# Patient Record
Sex: MALE | Race: WHITE | NOT HISPANIC OR LATINO | URBAN - METROPOLITAN AREA
[De-identification: names, ages, dates, MRNs, and addresses within clinical notes are randomized per-mention and may not be internally consistent; named-entity substitution may affect disease eponyms.]

---

## 2022-10-01 ENCOUNTER — INPATIENT (INPATIENT)
Facility: HOSPITAL | Age: 77
LOS: 4 days | Discharge: HOME HEALTH | End: 2022-10-06
Attending: INTERNAL MEDICINE | Admitting: INTERNAL MEDICINE

## 2022-10-01 VITALS
DIASTOLIC BLOOD PRESSURE: 84 MMHG | HEIGHT: 74 IN | RESPIRATION RATE: 18 BRPM | HEART RATE: 85 BPM | WEIGHT: 235.01 LBS | OXYGEN SATURATION: 99 % | TEMPERATURE: 98 F | SYSTOLIC BLOOD PRESSURE: 177 MMHG

## 2022-10-01 LAB
ALBUMIN SERPL ELPH-MCNC: 4.4 G/DL — SIGNIFICANT CHANGE UP (ref 3.5–5.2)
ALP SERPL-CCNC: 105 U/L — SIGNIFICANT CHANGE UP (ref 30–115)
ALT FLD-CCNC: 14 U/L — SIGNIFICANT CHANGE UP (ref 0–41)
ANION GAP SERPL CALC-SCNC: 12 MMOL/L — SIGNIFICANT CHANGE UP (ref 7–14)
AST SERPL-CCNC: 17 U/L — SIGNIFICANT CHANGE UP (ref 0–41)
BASOPHILS # BLD AUTO: 0.06 K/UL — SIGNIFICANT CHANGE UP (ref 0–0.2)
BASOPHILS NFR BLD AUTO: 0.6 % — SIGNIFICANT CHANGE UP (ref 0–1)
BILIRUB SERPL-MCNC: 0.8 MG/DL — SIGNIFICANT CHANGE UP (ref 0.2–1.2)
BUN SERPL-MCNC: 15 MG/DL — SIGNIFICANT CHANGE UP (ref 10–20)
CALCIUM SERPL-MCNC: 9.3 MG/DL — SIGNIFICANT CHANGE UP (ref 8.4–10.4)
CHLORIDE SERPL-SCNC: 103 MMOL/L — SIGNIFICANT CHANGE UP (ref 98–110)
CO2 SERPL-SCNC: 25 MMOL/L — SIGNIFICANT CHANGE UP (ref 17–32)
CREAT SERPL-MCNC: 1.1 MG/DL — SIGNIFICANT CHANGE UP (ref 0.7–1.5)
EGFR: 69 ML/MIN/1.73M2 — SIGNIFICANT CHANGE UP
EOSINOPHIL # BLD AUTO: 0.25 K/UL — SIGNIFICANT CHANGE UP (ref 0–0.7)
EOSINOPHIL NFR BLD AUTO: 2.7 % — SIGNIFICANT CHANGE UP (ref 0–8)
GLUCOSE SERPL-MCNC: 91 MG/DL — SIGNIFICANT CHANGE UP (ref 70–99)
HCT VFR BLD CALC: 38.8 % — LOW (ref 42–52)
HGB BLD-MCNC: 13.6 G/DL — LOW (ref 14–18)
IMM GRANULOCYTES NFR BLD AUTO: 0.3 % — SIGNIFICANT CHANGE UP (ref 0.1–0.3)
LYMPHOCYTES # BLD AUTO: 1.31 K/UL — SIGNIFICANT CHANGE UP (ref 1.2–3.4)
LYMPHOCYTES # BLD AUTO: 14 % — LOW (ref 20.5–51.1)
MCHC RBC-ENTMCNC: 32.3 PG — HIGH (ref 27–31)
MCHC RBC-ENTMCNC: 35.1 G/DL — SIGNIFICANT CHANGE UP (ref 32–37)
MCV RBC AUTO: 92.2 FL — SIGNIFICANT CHANGE UP (ref 80–94)
MONOCYTES # BLD AUTO: 0.62 K/UL — HIGH (ref 0.1–0.6)
MONOCYTES NFR BLD AUTO: 6.6 % — SIGNIFICANT CHANGE UP (ref 1.7–9.3)
NEUTROPHILS # BLD AUTO: 7.12 K/UL — HIGH (ref 1.4–6.5)
NEUTROPHILS NFR BLD AUTO: 75.8 % — HIGH (ref 42.2–75.2)
NRBC # BLD: 0 /100 WBCS — SIGNIFICANT CHANGE UP (ref 0–0)
PLATELET # BLD AUTO: 205 K/UL — SIGNIFICANT CHANGE UP (ref 130–400)
POTASSIUM SERPL-MCNC: 4.7 MMOL/L — SIGNIFICANT CHANGE UP (ref 3.5–5)
POTASSIUM SERPL-SCNC: 4.7 MMOL/L — SIGNIFICANT CHANGE UP (ref 3.5–5)
PROT SERPL-MCNC: 6.4 G/DL — SIGNIFICANT CHANGE UP (ref 6–8)
RBC # BLD: 4.21 M/UL — LOW (ref 4.7–6.1)
RBC # FLD: 13 % — SIGNIFICANT CHANGE UP (ref 11.5–14.5)
SARS-COV-2 RNA SPEC QL NAA+PROBE: SIGNIFICANT CHANGE UP
SODIUM SERPL-SCNC: 140 MMOL/L — SIGNIFICANT CHANGE UP (ref 135–146)
WBC # BLD: 9.39 K/UL — SIGNIFICANT CHANGE UP (ref 4.8–10.8)
WBC # FLD AUTO: 9.39 K/UL — SIGNIFICANT CHANGE UP (ref 4.8–10.8)

## 2022-10-01 PROCEDURE — 99222 1ST HOSP IP/OBS MODERATE 55: CPT

## 2022-10-01 PROCEDURE — 99283 EMERGENCY DEPT VISIT LOW MDM: CPT

## 2022-10-01 PROCEDURE — 93010 ELECTROCARDIOGRAM REPORT: CPT

## 2022-10-01 RX ORDER — SENNA PLUS 8.6 MG/1
2 TABLET ORAL AT BEDTIME
Refills: 0 | Status: DISCONTINUED | OUTPATIENT
Start: 2022-10-01 | End: 2022-10-06

## 2022-10-01 RX ORDER — CLOPIDOGREL BISULFATE 75 MG/1
75 TABLET, FILM COATED ORAL DAILY
Refills: 0 | Status: DISCONTINUED | OUTPATIENT
Start: 2022-10-01 | End: 2022-10-06

## 2022-10-01 RX ORDER — ENOXAPARIN SODIUM 100 MG/ML
40 INJECTION SUBCUTANEOUS EVERY 24 HOURS
Refills: 0 | Status: DISCONTINUED | OUTPATIENT
Start: 2022-10-01 | End: 2022-10-06

## 2022-10-01 RX ORDER — CARVEDILOL PHOSPHATE 80 MG/1
6.25 CAPSULE, EXTENDED RELEASE ORAL
Refills: 0 | Status: DISCONTINUED | OUTPATIENT
Start: 2022-10-01 | End: 2022-10-06

## 2022-10-01 RX ORDER — ASPIRIN/CALCIUM CARB/MAGNESIUM 324 MG
81 TABLET ORAL DAILY
Refills: 0 | Status: DISCONTINUED | OUTPATIENT
Start: 2022-10-01 | End: 2022-10-06

## 2022-10-01 RX ORDER — POLYETHYLENE GLYCOL 3350 17 G/17G
17 POWDER, FOR SOLUTION ORAL DAILY
Refills: 0 | Status: DISCONTINUED | OUTPATIENT
Start: 2022-10-01 | End: 2022-10-06

## 2022-10-01 RX ORDER — ATORVASTATIN CALCIUM 80 MG/1
80 TABLET, FILM COATED ORAL AT BEDTIME
Refills: 0 | Status: DISCONTINUED | OUTPATIENT
Start: 2022-10-01 | End: 2022-10-06

## 2022-10-01 RX ADMIN — CARVEDILOL PHOSPHATE 6.25 MILLIGRAM(S): 80 CAPSULE, EXTENDED RELEASE ORAL at 18:37

## 2022-10-01 RX ADMIN — ENOXAPARIN SODIUM 40 MILLIGRAM(S): 100 INJECTION SUBCUTANEOUS at 18:36

## 2022-10-01 RX ADMIN — ATORVASTATIN CALCIUM 80 MILLIGRAM(S): 80 TABLET, FILM COATED ORAL at 23:05

## 2022-10-01 RX ADMIN — SENNA PLUS 2 TABLET(S): 8.6 TABLET ORAL at 23:05

## 2022-10-01 NOTE — H&P ADULT - NSHPPHYSICALEXAM_GEN_ALL_CORE
GENERAL: NAD, lying in bed comfortably;   HEAD:  Atraumatic; Painless pustules over nose.   EYES: Blind  ENT: Moist mucous membranes  NECK: Supple, No JVD  CHEST/LUNG: Clear to auscultation bilaterally;   HEART: Regular rate and rhythm;  ABDOMEN: Bowel sounds present;   EXTREMITIES: No LE edema;   NERVOUS SYSTEM:  Alert & Oriented X3, speech clear. No deficits GENERAL: NAD, lying in bed comfortably;   HEAD:  Atraumatic; Painless pustules over nose.   EYES: Blind  ENT: Moist mucous membranes  NECK: Supple, No JVD  CHEST/LUNG: Clear to auscultation bilaterally;   HEART: Regular rate and rhythm;  ABDOMEN: Bowel sounds present; Midline surgical scar  EXTREMITIES: No LE edema;   NERVOUS SYSTEM:  Alert & Oriented X3, speech clear. No deficits

## 2022-10-01 NOTE — H&P ADULT - ASSESSMENT
78 y/o M w/ pmhx of CAD (w/ stents) & legal blindness presents for social issue.  Patient had a fight w/ his girlfriend who cares for him at home. She never returned after leaving and patient is unable to care for himself.   No medical complains of admission; Vitally stable in ED (x1 reading of elevated /84);       78 y/o M w/ pmhx of CAD (w/ stents) & legal blindness presents for social issue.  Patient lives w/ his girlfriend who cares for him at home. She was not home when he woke up this AM & was not able to reach her.   Patient decided to call 911 as he is not capable of taking care or medicating himself.     No medical complains of admission; Vital signs wnl in ED (except for x1 reading of elevated /84);    Labs unremarkable on admission; No need for daily labs;       # Social Admission - No one at home to take care of him;  consulted;     # hx of CAD - w/ stent placement (~1 yr ago; as per pt); c/w home Aspirin & Plavix for now;     # Legally Blind - Happened ~2yrs ago; As per pt, etiology was never diagnosed;     # Mild Normocytic Anemia - Hb 13.6 on admission; No signs of bleeding; No further w/u indicated at the moment;     Diet: DASH  Activity: Ambulate w/ Assistance (PT consult)  DVT ppx: Lovenox 40mg SQ Daily  GI ppx: n/a  FULL CODE    Dispo: Social admission 76 y/o M w/ pmhx of CAD (w/ stents) & legal blindness presents for social issue.  Patient lives w/ his girlfriend who cares for him at home. She was not home when he woke up this AM & was not able to reach her.   Patient decided to call 911 as he is not capable of taking care or medicating himself.     No medical complains of admission; Vital signs wnl in ED (except for x1 reading of elevated /84);    Labs unremarkable on admission; No need for daily labs;       # Social Admission - No one at home to take care of him;  consulted; PT/OT    # hx of CAD - w/ stent placement (~1 yr ago; as per pt); c/w home Aspirin & Plavix for now;     # Legally Blind - Happened ~2yrs ago; As per pt, etiology was never diagnosed;     # Mild Normocytic Anemia - Hb 13.6 on admission; No signs of bleeding; No further w/u indicated at the moment;     Diet: DASH  Activity: Ambulate w/ Assistance (PT consult)  DVT ppx: Lovenox 40mg SQ Daily  GI ppx: n/a  FULL CODE    Dispo: Social admission

## 2022-10-01 NOTE — ED PROVIDER NOTE - OBJECTIVE STATEMENT
76 yo M pmhx cad s/p stents, legally blind presenting to the ED for social admission, pt reports he lives at home with his girlfriend who cares for him, yesterday had a verbal argument and his girlfriend has not returned home. pt states he can not care for himself. denies any medical complaints at this time, only requesting food. denies fever, chills, chest pain, sob, nausea, vomiting, abd pain.

## 2022-10-01 NOTE — H&P ADULT - HISTORY OF PRESENT ILLNESS
78 y/o M w/ pmhx of CAD (w/ stents) & legal blindness presents for social issue.  Patient had a fight w/ his girlfriend who cares for him at home. She never returned after leaving and patient is unable to care for himself.   No medical complains of admission; Vitally stable in ED (x1 reading of elevated /84);     78 y/o M w/ pmhx of CAD (w/ stents) & legal blindness presents for social issue.  Patient lives w/ his girlfriend who cares for him at home. She was not home when he woke up this AM & was not able to reach her.   Patient decided to call 911 as he is not capable of taking care or medicating himself.     No medical complains of admission; Vital signs wnl in ED (except for x1 reading of elevated /84);

## 2022-10-01 NOTE — ED PROVIDER NOTE - NS ED ATTENDING STATEMENT MOD
This was a shared visit with the FAINA. I reviewed and verified the documentation and independently performed the documented:

## 2022-10-01 NOTE — ED ADULT NURSE NOTE - INTERVENTIONS DEFINITIONS
[place] to call system/Call bell, personal items and telephone within reach/Instruct patient to call for assistance/Non-slip footwear when patient is off stretcher/Physically safe environment: no spills, clutter or unnecessary equipment/Stretcher in lowest position, wheels locked, appropriate side rails in place/Provide visual cue, wrist band, yellow gown, etc./Monitor gait and stability/Monitor for mental status changes and reorient to person, place, and time/Reinforce activity limits and safety measures with patient and family

## 2022-10-01 NOTE — H&P ADULT - ATTENDING COMMENTS
Patient seen and examined at bedside independently of the residents. I read the resident's note and agree with the plan with the additions and corrections as noted below.    REVIEW OF SYSTEMS:  CONSTITUTIONAL: No weakness, fevers or chills  EYES/ENT: No visual changes;  No vertigo or throat pain   NECK: No pain or stiffness  RESPIRATORY: No cough, wheezing, hemoptysis; No shortness of breath  CARDIOVASCULAR: No chest pain or palpitations  GASTROINTESTINAL: No abdominal or epigastric pain. No nausea, vomiting, or hematemesis; No diarrhea or constipation. No melena or hematochezia.  GENITOURINARY: No dysuria, frequency or hematuria  NEUROLOGICAL: No numbness or weakness  MSK: No pain. No weakness.   SKIN: No itching, rashes.     PMH: CAD s/p stents, Legally blind, HTN     FHx: Reviewed. Not relevant.     Physical Exam:  GEN: No acute distress. A & O x 3.   Head: Atraumatic, Normocephalic.   Eye: PEERLA. No sclera icterus. EOMI.   ENT: Normal oropharynx, no thyromegaly.   LUNGS: Clear to auscultation bilaterally. No wheeze/rales/crackles.   HEART: Normal. S1/S2 present. RRR. No murmur/gallops.   ABD: Soft, non-tender, non-distended. Bowel sounds present.   EXT: No pitting edema.   Integumentary: No rash, No petechia.   NEURO: CN III-XII intact. Strength: 5/5 b/l ULE. Sensory intact b/l ULE.     Vital Signs Last 24 Hrs  T(C): 37.1 (01 Oct 2022 16:07), Max: 37.1 (01 Oct 2022 16:07)  T(F): 98.7 (01 Oct 2022 16:07), Max: 98.7 (01 Oct 2022 16:07)  HR: 74 (01 Oct 2022 18:38) (74 - 85)  BP: 167/77 (01 Oct 2022 18:38) (167/77 - 179/96)  BP(mean): --  RR: 18 (01 Oct 2022 18:38) (18 - 18)  SpO2: 98% (01 Oct 2022 18:38) (98% - 99%)    Parameters below as of 01 Oct 2022 18:38  Patient On (Oxygen Delivery Method): room air      Please see the above notes for Labs and radiology.     Assessment and Plan:     78 yo M with hx of CAD s/p stents, HTN and Legally blind presents to ED because he is not able to care for himself at home.     Unable to care self   - patient is legally blind.   -  consult.     CAD s/p stent - ASA, Plavix, statin and BB     Mild normocytic anemia - Hb 13.6. No evidence of bleeding. Monitor CBC.     DVT ppx: DASH   GI ppx: not indicated.   Diet: DASH  Activity: as tolerated.     Date seen by the attending: 10/01/2022.

## 2022-10-01 NOTE — ED PROVIDER NOTE - ATTENDING APP SHARED VISIT CONTRIBUTION OF CARE
76 yo M pmh of CAD with stents, legally blind presents for social issue. Lives at home with his girlfriend who helps to care for him. States that yesterday morning he got in a verbal argument with her and she left and has not returned since. States that he is very worried about her since she has not returned. He is unable to care for himself at home since she is gone. Reports not eating. no physical complaints at this time.     CONSTITUTIONAL: Well-developed; well-nourished; in no acute distress.   SKIN: warm, dry  HEAD: Normocephalic; atraumatic.  EYES: PERRL, EOMI, no conjunctival erythema  ENT: No nasal discharge; airway clear.  NECK: Supple; non tender.  CARD: S1, S2 normal;  Regular rate and rhythm.   RESP: No wheezes, rales or rhonchi.  ABD: soft non tender, non distended, no rebound or guarding. surgical scars on abdomen.   EXT: Normal ROM.  5/5 strength in all 4 extremities   LYMPH: No acute cervical adenopathy.  NEURO: Alert, oriented, grossly unremarkable. neurovascularly intact  PSYCH: Cooperative, appropriate.

## 2022-10-01 NOTE — ED ADULT TRIAGE NOTE - CHIEF COMPLAINT QUOTE
BIBA from home. Here for social placement. Patient normally has someone who takes care from him but they had a verbal dispute and never came back home. Patient is legally blind and unable to ambulate.

## 2022-10-01 NOTE — ED PROVIDER NOTE - CLINICAL SUMMARY MEDICAL DECISION MAKING FREE TEXT BOX
Patient presents after his caregiver did not return home. Unable to care for himself due to mobility issues and legally blind. labs, ekg done. Patient admitted for social reasons.

## 2022-10-01 NOTE — ED PROVIDER NOTE - PRO INTERPRETER NEED 2
Avoid prolonged standing or sitting without elevating your legs.    - Multilayer compression wrap to both legs. Do not get wet and keep on for the week. Only remove if temperature or sensation changes.    Keep dressing clean and dry and cover while bathing. Only change dressing if over saturated, soiled or its falling off.     Should you experience any significant changes in your wound(s) such as infection (redness, swelling, localized heat, increased pain, fever >101 F, chills) or have any questions regarding your home care instructions, please contact the wound center [phone]. If after hours, contact your primary care physician or go the hospital emergency room.     English

## 2022-10-01 NOTE — H&P ADULT - NSHPLABSRESULTS_GEN_ALL_CORE
13.6   9.39  )-----------( 205      ( 01 Oct 2022 11:30 )             38.8       10-01    140  |  103  |  15  ----------------------------<  91  4.7   |  25  |  1.1    Ca    9.3      01 Oct 2022 11:30    TPro  6.4  /  Alb  4.4  /  TBili  0.8  /  DBili  x   /  AST  17  /  ALT  14  /  AlkPhos  105  10-01

## 2022-10-01 NOTE — ED PROVIDER NOTE - PHYSICAL EXAMINATION
GENERAL: Well-nourished, Well-developed. NAD.  HEAD: No visible or palpable bumps or hematomas. No ecchymosis behind ears B/L.  ENMT: MMM.   Neck: Supple. FROM  CVS: Normal S1,S2. No murmurs appreciated on auscultation   RESP: No use of accessory muscles. Chest rise symmetrical with good expansion. Lungs clear to auscultation B/L. No wheezing, rales, or rhonchi auscultated.  GI: Normal auscultation of bowel sounds in all 4 quadrants. Soft, Nontender, Nondistended. No guarding or rebound tenderness. No CVAT B/L.  Skin: Warm, Dry. No rashes or lesions. Good cap refill < 2 sec B/L.  EXT: Radial and pedal pulses present B/L. No calf tenderness or swelling B/L. No palpable cords. No pedal edema B/L.

## 2022-10-02 PROCEDURE — 99233 SBSQ HOSP IP/OBS HIGH 50: CPT

## 2022-10-02 RX ORDER — OLANZAPINE 15 MG/1
2.5 TABLET, FILM COATED ORAL AT BEDTIME
Refills: 0 | Status: DISCONTINUED | OUTPATIENT
Start: 2022-10-02 | End: 2022-10-06

## 2022-10-02 RX ORDER — HALOPERIDOL DECANOATE 100 MG/ML
25 INJECTION INTRAMUSCULAR ONCE
Refills: 0 | Status: DISCONTINUED | OUTPATIENT
Start: 2022-10-02 | End: 2022-10-02

## 2022-10-02 RX ORDER — HALOPERIDOL DECANOATE 100 MG/ML
0.5 INJECTION INTRAMUSCULAR ONCE
Refills: 0 | Status: COMPLETED | OUTPATIENT
Start: 2022-10-02 | End: 2022-10-02

## 2022-10-02 RX ADMIN — HALOPERIDOL DECANOATE 0.5 MILLIGRAM(S): 100 INJECTION INTRAMUSCULAR at 02:47

## 2022-10-02 RX ADMIN — ENOXAPARIN SODIUM 40 MILLIGRAM(S): 100 INJECTION SUBCUTANEOUS at 17:16

## 2022-10-02 RX ADMIN — CLOPIDOGREL BISULFATE 75 MILLIGRAM(S): 75 TABLET, FILM COATED ORAL at 11:52

## 2022-10-02 RX ADMIN — CARVEDILOL PHOSPHATE 6.25 MILLIGRAM(S): 80 CAPSULE, EXTENDED RELEASE ORAL at 17:14

## 2022-10-02 RX ADMIN — ATORVASTATIN CALCIUM 80 MILLIGRAM(S): 80 TABLET, FILM COATED ORAL at 22:04

## 2022-10-02 RX ADMIN — OLANZAPINE 2.5 MILLIGRAM(S): 15 TABLET, FILM COATED ORAL at 22:04

## 2022-10-02 RX ADMIN — SENNA PLUS 2 TABLET(S): 8.6 TABLET ORAL at 22:04

## 2022-10-02 RX ADMIN — Medication 81 MILLIGRAM(S): at 11:53

## 2022-10-02 RX ADMIN — CARVEDILOL PHOSPHATE 6.25 MILLIGRAM(S): 80 CAPSULE, EXTENDED RELEASE ORAL at 05:20

## 2022-10-02 NOTE — OCCUPATIONAL THERAPY INITIAL EVALUATION ADULT - NS ASR FOLLOW COMMAND OT EVAL
able to follow 2-3 step commands. However, due to legal blindness and unfamiliarity with environment, required step-by-step vc to complete tasks./100% of the time

## 2022-10-02 NOTE — PROGRESS NOTE ADULT - SUBJECTIVE AND OBJECTIVE BOX
MORRISKIMMY  77y  Male    Patient is a 77y old  Male who presents with a chief complaint of Social Admission (01 Oct 2022 14:44)      OVERNIGHT EVENTS: was agitated at night and received 1 dose of haldol     PAST MEDICAL & SURGICAL HISTORY:  Legally blind  CAD  HLD   Constipation     Vitals:  T(F): 96.6 (10-02-22 @ 14:50), Max: 98.7 (10-01-22 @ 16:07)  HR: 68 (10-02-22 @ 14:50) (68 - 80)  BP: 134/60 (10-02-22 @ 14:50) (113/57 - 179/96)  RR: 18 (10-02-22 @ 14:50) (18 - 18)  SpO2: 92% (10-02-22 @ 14:50) (92% - 99%)    PHYSICAL EXAM:  GENERAL: NAD, well-developed  HEAD:  Atraumatic, Normocephalic  EYES: open blind   NECK: Supple, No JVD  CHEST/LUNG: Clear to auscultation bilaterally; No wheeze  HEART: Regular rate and rhythm; No murmurs, rubs, or gallops  ABDOMEN: Soft, Nontender, Nondistended; Bowel sounds present  EXTREMITIES:  2+ Peripheral Pulses, No clubbing, cyanosis, or edema  PSYCH: AAOx3  NEUROLOGY: non-focal  SKIN: No rashes or lesions    LABS:                        13.6   9.39  )-----------( 205      ( 01 Oct 2022 11:30 )             38.8       10-01    140  |  103  |  15  ----------------------------<  91  4.7   |  25  |  1.1    Ca    9.3      01 Oct 2022 11:30    TPro  6.4  /  Alb  4.4  /  TBili  0.8  /  DBili  x   /  AST  17  /  ALT  14  /  AlkPhos  105  10-01    MICROBIOLOGY: none    MEDICATIONS  (STANDING):  aspirin enteric coated 81 milliGRAM(s) Oral daily  atorvastatin 80 milliGRAM(s) Oral at bedtime  carvedilol Oral Tab/Cap - Peds 6.25 milliGRAM(s) Oral two times a day  clopidogrel Tablet 75 milliGRAM(s) Oral daily  enoxaparin Injectable 40 milliGRAM(s) SubCutaneous every 24 hours  OLANZapine 2.5 milliGRAM(s) Oral at bedtime  senna 2 Tablet(s) Oral at bedtime    MEDICATIONS  (PRN):  polyethylene glycol 3350 17 Gram(s) Oral daily PRN Constipation

## 2022-10-02 NOTE — OCCUPATIONAL THERAPY INITIAL EVALUATION ADULT - LEVEL OF INDEPENDENCE: EATING, OT EVAL
AS per pt report, uses trial and error in home environment to locate food on plate. Demonstrated mod/max assist fo locate items on plate./maximum assist (25% patients effort)

## 2022-10-02 NOTE — OCCUPATIONAL THERAPY INITIAL EVALUATION ADULT - LEVEL OF INDEPENDENCE: DRESS UPPER BODY, OT EVAL
Pt did not have clothes. Simulated with hospital gown, explaining to pt to put on as button down top. Pt, using trial and error,  unable, through touch and vc, to locate arm openings./maximum assist (25% patients effort)

## 2022-10-02 NOTE — OCCUPATIONAL THERAPY INITIAL EVALUATION ADULT - TRANSFER TRAINING, PT EVAL
In one week, pt will demonstrate sit <>stand/bed/commode transfers with close supervision with appropriate AD, with step-by-step vc.

## 2022-10-02 NOTE — OCCUPATIONAL THERAPY INITIAL EVALUATION ADULT - LEVEL OF INDEPENDENCE: DRESS LOWER BODY, OT EVAL
Pt performed task, using trial and error. Able to locate B/L leg openings, however, only able to thread L pant leg. Unable to replicate on right. Unable to use feel and touch to thread right leg./moderate assist (50% patients effort)

## 2022-10-02 NOTE — OCCUPATIONAL THERAPY INITIAL EVALUATION ADULT - PERTINENT HX OF CURRENT PROBLEM, REHAB EVAL
76 y/o M w/ pmhx of CAD (w/ stents) & legal blindness presents for social issue. Patient lives w/ his girlfriend who cares for him at home. She was not home when he woke up this AM & was not able to reach her. Patient decided to call 911 as he is not capable of taking care or medicating himself. No medical complains of admission; Vital signs wnl in ED (except for x1 reading of elevated /84);

## 2022-10-02 NOTE — OCCUPATIONAL THERAPY INITIAL EVALUATION ADULT - TRANSFER SAFETY CONCERNS NOTED: BED/CHAIR, REHAB EVAL
decreased balance during turns/decreased safety awareness/losing balance/stand pivot/decreased step length/decreased weight-shifting ability

## 2022-10-02 NOTE — OCCUPATIONAL THERAPY INITIAL EVALUATION ADULT - ADDITIONAL COMMENTS
As per pt report, resides on 1 level in first floor apartment in private house with two steps to enter. Is primarily homebound. As per pt report, would take walks on block with significant other as . As per pt report, performed ADLS as trial and error for locating food on plate, locating openings for garments and such.

## 2022-10-02 NOTE — PATIENT PROFILE ADULT - FALL HARM RISK - HARM RISK INTERVENTIONS

## 2022-10-02 NOTE — OCCUPATIONAL THERAPY INITIAL EVALUATION ADULT - PHYSICAL ASSIST/NONPHYSICAL ASSIST: SIT/SUPINE, REHAB EVAL
Post-Op Assessment Note      CV Status:  Stable    Mental Status:  Alert and awake    Hydration Status:  Euvolemic    PONV Controlled:  Controlled    Airway Patency:  Patent    Post Op Vitals Reviewed: Yes          Staff: Anesthesiologist           BP      Temp      Pulse     Resp      SpO2 step-by-step vc for locating pillow, etc/verbal cues/1 person assist

## 2022-10-02 NOTE — OCCUPATIONAL THERAPY INITIAL EVALUATION ADULT - NSACTIVITYREC_GEN_A_OT
Patient requires assistance with activities of daily living; contact guard for sit<>stand/bed transfers.. OT recommends D/C to subacute rehabilitation facility versus LTC/SNF when medically appropriate. OT recommends commode/shower chair with wall grab bars for safe D/C to next level of care. Refer to evaluation for details.

## 2022-10-03 ENCOUNTER — TRANSCRIPTION ENCOUNTER (OUTPATIENT)
Age: 77
End: 2022-10-03

## 2022-10-03 LAB
HCV AB S/CO SERPL IA: 0.03 COI — SIGNIFICANT CHANGE UP
HCV AB SERPL-IMP: SIGNIFICANT CHANGE UP

## 2022-10-03 PROCEDURE — 99232 SBSQ HOSP IP/OBS MODERATE 35: CPT

## 2022-10-03 RX ORDER — POLYETHYLENE GLYCOL 3350 17 G/17G
17 POWDER, FOR SOLUTION ORAL
Qty: 51 | Refills: 0
Start: 2022-10-03 | End: 2022-10-05

## 2022-10-03 RX ORDER — OLANZAPINE 15 MG/1
1 TABLET, FILM COATED ORAL
Qty: 30 | Refills: 0
Start: 2022-10-03 | End: 2022-11-01

## 2022-10-03 RX ORDER — LANOLIN ALCOHOL/MO/W.PET/CERES
5 CREAM (GRAM) TOPICAL AT BEDTIME
Refills: 0 | Status: DISCONTINUED | OUTPATIENT
Start: 2022-10-03 | End: 2022-10-06

## 2022-10-03 RX ADMIN — Medication 81 MILLIGRAM(S): at 11:43

## 2022-10-03 RX ADMIN — ENOXAPARIN SODIUM 40 MILLIGRAM(S): 100 INJECTION SUBCUTANEOUS at 17:42

## 2022-10-03 RX ADMIN — OLANZAPINE 2.5 MILLIGRAM(S): 15 TABLET, FILM COATED ORAL at 21:18

## 2022-10-03 RX ADMIN — ATORVASTATIN CALCIUM 80 MILLIGRAM(S): 80 TABLET, FILM COATED ORAL at 21:18

## 2022-10-03 RX ADMIN — SENNA PLUS 2 TABLET(S): 8.6 TABLET ORAL at 21:18

## 2022-10-03 RX ADMIN — CARVEDILOL PHOSPHATE 6.25 MILLIGRAM(S): 80 CAPSULE, EXTENDED RELEASE ORAL at 17:42

## 2022-10-03 RX ADMIN — CLOPIDOGREL BISULFATE 75 MILLIGRAM(S): 75 TABLET, FILM COATED ORAL at 11:43

## 2022-10-03 RX ADMIN — Medication 5 MILLIGRAM(S): at 22:15

## 2022-10-03 RX ADMIN — CARVEDILOL PHOSPHATE 6.25 MILLIGRAM(S): 80 CAPSULE, EXTENDED RELEASE ORAL at 06:01

## 2022-10-03 NOTE — DISCHARGE NOTE PROVIDER - NSDCMRMEDTOKEN_GEN_ALL_CORE_FT
aspirin 81 mg oral delayed release tablet: 1 tab(s) orally once a day  atorvastatin 80 mg oral tablet: 1 tab(s) orally once a day  carvedilol 6.25 mg oral tablet: 1 tab(s) orally 2 times a day  Plavix 75 mg oral tablet: 1 tab(s) orally once a day  Senna 8.6 mg oral tablet: 1 tab(s) orally once a day (at bedtime)  Vitamin B-12 1000 mcg oral tablet: 1 tab(s) orally once a day

## 2022-10-03 NOTE — PROGRESS NOTE ADULT - SUBJECTIVE AND OBJECTIVE BOX
KIMMY MORRIS 77y Male  MRN#: [id number]   CODE STATUS:________full    Hospital Day: 2d    Pt is currently admitted with the primary diagnosis of social issues    SUBJECTIVE    No adverse overnight events     Subjective complaints     Patient was examined and seen by the bedside. No complaints. Denies fever, chills, SOB, nausea, vomiting.                                           OBJECTIVE  PAST MEDICAL & SURGICAL HISTORY  Legally blind                                                ALLERGIES:  No Known Allergies                           HOME MEDICATIONS  Home Medications:  aspirin 81 mg oral delayed release tablet: 1 tab(s) orally once a day (01 Oct 2022 15:47)  atorvastatin 80 mg oral tablet: 1 tab(s) orally once a day (01 Oct 2022 15:47)  carvedilol 6.25 mg oral tablet: 1 tab(s) orally 2 times a day (01 Oct 2022 15:47)  Plavix 75 mg oral tablet: 1 tab(s) orally once a day (01 Oct 2022 15:47)  Senna 8.6 mg oral tablet: 1 tab(s) orally once a day (at bedtime) (01 Oct 2022 15:47)  Vitamin B-12 1000 mcg oral tablet: 1 tab(s) orally once a day (01 Oct 2022 15:47)                           MEDICATIONS:  STANDING MEDICATIONS  aspirin enteric coated 81 milliGRAM(s) Oral daily  atorvastatin 80 milliGRAM(s) Oral at bedtime  carvedilol Oral Tab/Cap - Peds 6.25 milliGRAM(s) Oral two times a day  clopidogrel Tablet 75 milliGRAM(s) Oral daily  enoxaparin Injectable 40 milliGRAM(s) SubCutaneous every 24 hours  OLANZapine 2.5 milliGRAM(s) Oral at bedtime  senna 2 Tablet(s) Oral at bedtime    PRN MEDICATIONS  polyethylene glycol 3350 17 Gram(s) Oral daily PRN                                            ------------------------------------------------------------  VITAL SIGNS: Last 24 Hours  T(C): 36.1 (03 Oct 2022 05:00), Max: 37.2 (02 Oct 2022 20:00)  T(F): 97 (03 Oct 2022 05:00), Max: 98.9 (02 Oct 2022 20:00)  HR: 65 (03 Oct 2022 05:00) (65 - 71)  BP: 143/64 (03 Oct 2022 05:00) (112/57 - 143/64)  BP(mean): --  RR: 19 (03 Oct 2022 05:00) (16 - 19)  SpO2: 95% (03 Oct 2022 05:00) (92% - 95%)      10-02-22 @ 07:01  -  10-03-22 @ 07:00  --------------------------------------------------------  IN: 260 mL / OUT: 575 mL / NET: -315 mL                                               LABS:                        13.6   9.39  )-----------( 205      ( 01 Oct 2022 11:30 )             38.8     10-01    140  |  103  |  15  ----------------------------<  91  4.7   |  25  |  1.1    Ca    9.3      01 Oct 2022 11:30    TPro  6.4  /  Alb  4.4  /  TBili  0.8  /  DBili  x   /  AST  17  /  ALT  14  /  AlkPhos  105  10-01                                                              RADIOLOGY:        PHYSICAL EXAM:  GENERAL: NAD, lying in bed comfortably  HEAD:  Atraumatic, Normocephalic  EYES: conjunctiva and sclera clear  ENT: Moist mucous membranes  NECK: Supple, No JVD  CHEST/LUNG: Clear to auscultation bilaterally; Unlabored respirations  HEART: Regular rate and rhythm;  ABDOMEN: Soft, nontender, nondistended  EXTREMITIES:  No clubbing, cyanosis, or edema  NERVOUS SYSTEM:  A&Ox2, no focal deficits   SKIN: No rashes or lesions                                         ASSESSMENT & PLAN    78 y/o M w/ pmhx of CAD (w/ stents) & legal blindness presents for social issue.  Patient lives w/ his girlfriend who cares for him at home. She was not home when he woke up this AM & was not able to reach her.   Patient decided to call 911 as he is not capable of taking care or medicating himself.     No medical complains on admission; Vital signs wnl in ED (except for x1 reading of elevated /84);    Labs unremarkable on admission; No need for daily labs    # Agitation Overnight   - s/p haldol once / zyprexa 2.5mg po at night ordered     # Social Admission - No one at home to take care of him  -  consulted  - PT Eval recommending Rehab / NH     # hx of CAD - w/ stent placement (~1 yr ago; as per pt); c/w home Aspirin & Plavix    # Legally Blind - Happened ~2yrs ago; As per pt, etiology was never diagnosed     # Mild Normocytic Anemia - Hb 13.6 on admission; No signs of bleeding; No further w/u indicated at the moment;     Diet: DASH  Activity: Ambulate w/ Assistance due to blindness   DVT ppx: Lovenox 40mg SQ Daily  GI ppx: n/a  FULL CODE    Pending: Placement / SW Evaluation     Dispo: Social admission d/c planning anticipated

## 2022-10-03 NOTE — DISCHARGE NOTE PROVIDER - CARE PROVIDER_API CALL
Gale Lemus (DO)  Geriatric Medicine; Internal Medicine  [address] [place] [address]  Phone: [phone]  Fax: [phone]  Follow Up Time:

## 2022-10-03 NOTE — PHYSICAL THERAPY INITIAL EVALUATION ADULT - ADDITIONAL COMMENTS
Pt reports he would amb small distances in home with RW or SC and close supervision/guidance from girlfriend (Avelina) 2* to visual impairement/blindness. He stated she had taken care of him and assisted with all ADL's.

## 2022-10-03 NOTE — DISCHARGE NOTE PROVIDER - NSDCCPCAREPLAN_GEN_ALL_CORE_FT
PRINCIPAL DISCHARGE DIAGNOSIS  Diagnosis: Hospital admission due to social situation  Assessment and Plan of Treatment: You were admitted due to difficulty performing activities of everyday living. Please follow up with the assissted [facility] facility we provided.       PRINCIPAL DISCHARGE DIAGNOSIS  Diagnosis: Hospital admission due to social situation  Assessment and Plan of Treatment: You were admitted due to difficulty performing activities of everyday living. Please follow up with the assissted [facility] facility we provided.  WIFE IS NOW AVAILABLE TO TAKE CARE OF MR MORRIS AT HOME  D/C HOME   LEGALLY BLIND AND NEEDS ASSISTANCE 24HRS A DAY AT HOME   - IF WIFE UNABLE TO CARE FOR YOU AT HOME SPEAK WITH YOUR PCP FOR LONG TERM CARE PLACEMENT LIKE A NURSING HOME.   - FOLLOW WITH COMMUNITY

## 2022-10-03 NOTE — DISCHARGE NOTE PROVIDER - ATTENDING DISCHARGE PHYSICAL EXAMINATION:
Vital Signs Last 24 Hrs  T(C): 36.1 (04 Oct 2022 05:00), Max: 36.9 (03 Oct 2022 20:00)  T(F): 97 (04 Oct 2022 05:00), Max: 98.4 (03 Oct 2022 20:00)  HR: 69 (04 Oct 2022 05:00) (63 - 75)  BP: 131/59 (04 Oct 2022 05:00) (111/57 - 131/59)  RR: 18 (04 Oct 2022 05:00) (17 - 18)  SpO2: 93% (04 Oct 2022 05:00) (93% - 94%)    Parameters below as of 04 Oct 2022 05:00  Patient On (Oxygen Delivery Method): room air    GEN: NAD  RESP: CTA B/L   CV: R/R/R  GI: +BS SOFT NT ND  EXT: NO E/C/C   MS: AOX3  LEGALLY BLIND

## 2022-10-03 NOTE — DISCHARGE NOTE PROVIDER - HOSPITAL COURSE
78 y/o M w/ pmhx of CAD (w/ stents) & legal blindness presents for social issue.  Patient lives w/ his girlfriend who cares for him at home. She was not home when he woke up this AM & was not able to reach her.   Patient decided to call 911 as he is not capable of taking care or medicating himself.     # Agitation Overnight   - s/p haldol once / zyprexa 2.5mg po at night ordered     # Social Admission - No one at home to take care of him  -  consulted  - PT Eval recommending Rehab / NH     # hx of CAD - w/ stent placement (~1 yr ago; as per pt); c/w home Aspirin & Plavix    # Legally Blind - Happened ~2yrs ago; As per pt, etiology was never diagnosed     # Mild Normocytic Anemia - Hb 13.6 on admission; No signs of bleeding; No further w/u indicated at the moment;    76 y/o M w/ pmhx of CAD (w/ stents) & legal blindness presents for social issue.  Patient lives w/ his girlfriend who cares for him at home. She was not home when he woke up this AM & was not able to reach her.   Patient decided to call 911 as he is not capable of taking care or medicating himself.     # Agitation Overnight   - s/p haldol once / zyprexa 2.5mg po at night ordered     # Social Admission - No one at home to take care of him  -  consulted  - PT Eval recommending Rehab / NH     # hx of CAD - w/ stent placement (~1 yr ago; as per pt); c/w home Aspirin & Plavix    # Legally Blind - Happened ~2yrs ago; As per pt, etiology was never diagnosed     # Mild Normocytic Anemia - Hb 13.6 on admission; No signs of bleeding; No further w/u indicated at the moment;     Patient is medically stabilized and cleared for discharge

## 2022-10-03 NOTE — DISCHARGE NOTE PROVIDER - NSFOLLOWUPCLINICS_GEN_ALL_ED_FT
[facility] Ambulatory Care [facility]  Ambulatory Care  [address] [place] [id number]  Phone: [phone]  Fax:

## 2022-10-03 NOTE — PHYSICAL THERAPY INITIAL EVALUATION ADULT - PERTINENT HX OF CURRENT PROBLEM, REHAB EVAL
78 y/o M w/ pmhx of CAD (w/ stents) & legal blindness presents for social issue.  Patient lives w/ his girlfriend who cares for him at home. She was not home when he woke up this AM & was not able to reach her. Patient decided to call 911 as he is not capable of taking care or medicating himself.

## 2022-10-03 NOTE — PROGRESS NOTE ADULT - SUBJECTIVE AND OBJECTIVE BOX
FOX MORRISDERICK  77y  Male    Patient is a 77y old  Male who presents with a chief complaint of Social Admission (01 Oct 2022 14:44)    OVERNIGHT EVENTS: doing better no complaints     PAST MEDICAL & SURGICAL HISTORY:  Legally blind  CAD  HLD   Constipation     Vital Signs Last 24 Hrs  T(C): 36.1 (03 Oct 2022 05:00), Max: 37.2 (02 Oct 2022 20:00)  T(F): 97 (03 Oct 2022 05:00), Max: 98.9 (02 Oct 2022 20:00)  HR: 65 (03 Oct 2022 05:00) (65 - 71)  BP: 143/64 (03 Oct 2022 05:00) (112/57 - 143/64)  RR: 19 (03 Oct 2022 05:00) (16 - 19)  SpO2: 95% (03 Oct 2022 05:00) (92% - 95%)    Parameters below as of 02 Oct 2022 20:00  Patient On (Oxygen Delivery Method): room air      PHYSICAL EXAM:  GENERAL: NAD, well-developed  HEAD:  Atraumatic, Normocephalic  EYES: open blind   NECK: Supple, No JVD  CHEST/LUNG: Clear to auscultation bilaterally; No wheeze  HEART: Regular rate and rhythm; No murmurs, rubs, or gallops  ABDOMEN: Soft, Nontender, Nondistended; Bowel sounds present  EXTREMITIES:  2+ Peripheral Pulses, No clubbing, cyanosis, or edema  PSYCH: AAOx3  NEUROLOGY: non-focal  SKIN: No rashes or lesions    LABS:                        13.6   9.39  )-----------( 205      ( 01 Oct 2022 11:30 )             38.8       10-01    140  |  103  |  15  ----------------------------<  91  4.7   |  25  |  1.1    Ca    9.3      01 Oct 2022 11:30    TPro  6.4  /  Alb  4.4  /  TBili  0.8  /  DBili  x   /  AST  17  /  ALT  14  /  AlkPhos  105  10-01    MICROBIOLOGY: none    MEDICATIONS  (STANDING):  aspirin enteric coated 81 milliGRAM(s) Oral daily  atorvastatin 80 milliGRAM(s) Oral at bedtime  carvedilol Oral Tab/Cap - Peds 6.25 milliGRAM(s) Oral two times a day  clopidogrel Tablet 75 milliGRAM(s) Oral daily  enoxaparin Injectable 40 milliGRAM(s) SubCutaneous every 24 hours  OLANZapine 2.5 milliGRAM(s) Oral at bedtime  senna 2 Tablet(s) Oral at bedtime    MEDICATIONS  (PRN):  polyethylene glycol 3350 17 Gram(s) Oral daily PRN Constipation             S/p LVAD placement. Treatment in CTICU.  - Off pressors

## 2022-10-03 NOTE — PHYSICAL THERAPY INITIAL EVALUATION ADULT - GENERAL OBSERVATIONS, REHAB EVAL
13:30-14:00. Pt encountered semifowler in bed in NAD, rock-n-roll posey brace present, no complaints, agreeable to PT.

## 2022-10-04 ENCOUNTER — TRANSCRIPTION ENCOUNTER (OUTPATIENT)
Age: 77
End: 2022-10-04

## 2022-10-04 RX ORDER — DIPHENHYDRAMINE HCL 50 MG
25 CAPSULE ORAL ONCE
Refills: 0 | Status: COMPLETED | OUTPATIENT
Start: 2022-10-04 | End: 2022-10-04

## 2022-10-04 RX ORDER — ACETAMINOPHEN 500 MG
650 TABLET ORAL EVERY 6 HOURS
Refills: 0 | Status: DISCONTINUED | OUTPATIENT
Start: 2022-10-04 | End: 2022-10-06

## 2022-10-04 RX ADMIN — Medication 81 MILLIGRAM(S): at 11:34

## 2022-10-04 RX ADMIN — Medication 25 MILLIGRAM(S): at 22:36

## 2022-10-04 RX ADMIN — CLOPIDOGREL BISULFATE 75 MILLIGRAM(S): 75 TABLET, FILM COATED ORAL at 11:34

## 2022-10-04 RX ADMIN — CARVEDILOL PHOSPHATE 6.25 MILLIGRAM(S): 80 CAPSULE, EXTENDED RELEASE ORAL at 17:31

## 2022-10-04 RX ADMIN — ATORVASTATIN CALCIUM 80 MILLIGRAM(S): 80 TABLET, FILM COATED ORAL at 21:04

## 2022-10-04 RX ADMIN — CARVEDILOL PHOSPHATE 6.25 MILLIGRAM(S): 80 CAPSULE, EXTENDED RELEASE ORAL at 05:06

## 2022-10-04 RX ADMIN — ENOXAPARIN SODIUM 40 MILLIGRAM(S): 100 INJECTION SUBCUTANEOUS at 17:56

## 2022-10-04 RX ADMIN — OLANZAPINE 2.5 MILLIGRAM(S): 15 TABLET, FILM COATED ORAL at 21:04

## 2022-10-04 RX ADMIN — Medication 650 MILLIGRAM(S): at 00:50

## 2022-10-04 RX ADMIN — SENNA PLUS 2 TABLET(S): 8.6 TABLET ORAL at 21:04

## 2022-10-04 NOTE — DISCHARGE NOTE NURSING/CASE MANAGEMENT/SOCIAL WORK - PATIENT PORTAL LINK FT
You can access the FollowMyHealth Patient Portal offered by [facility] by registering at the following website: [email or web address]. By joining [employer]’s FollowMyHealth portal, you will also be able to view your health information using other applications (apps) compatible with our system.

## 2022-10-04 NOTE — PROGRESS NOTE ADULT - SUBJECTIVE AND OBJECTIVE BOX
KIMMY MORRIS 77y Male  MRN#: [id number]   CODE STATUS:________full    Hospital Day: 3d    Pt is currently admitted with the primary diagnosis of social issues    SUBJECTIVE    No adverse overnight events     Subjective complaints     Patient was examined and seen by the bedside. No complaints. Denies fever, chills, SOB, nausea, vomiting.                                           OBJECTIVE  PAST MEDICAL & SURGICAL HISTORY  Legally blind                                                ALLERGIES:  No Known Allergies                           HOME MEDICATIONS  Home Medications:  aspirin 81 mg oral delayed release tablet: 1 tab(s) orally once a day (01 Oct 2022 15:47)  atorvastatin 80 mg oral tablet: 1 tab(s) orally once a day (01 Oct 2022 15:47)  carvedilol 6.25 mg oral tablet: 1 tab(s) orally 2 times a day (01 Oct 2022 15:47)  Plavix 75 mg oral tablet: 1 tab(s) orally once a day (01 Oct 2022 15:47)  Senna 8.6 mg oral tablet: 1 tab(s) orally once a day (at bedtime) (01 Oct 2022 15:47)  Vitamin B-12 1000 mcg oral tablet: 1 tab(s) orally once a day (01 Oct 2022 15:47)                           MEDICATIONS:  STANDING MEDICATIONS  aspirin enteric coated 81 milliGRAM(s) Oral daily  atorvastatin 80 milliGRAM(s) Oral at bedtime  carvedilol Oral Tab/Cap - Peds 6.25 milliGRAM(s) Oral two times a day  clopidogrel Tablet 75 milliGRAM(s) Oral daily  enoxaparin Injectable 40 milliGRAM(s) SubCutaneous every 24 hours  OLANZapine 2.5 milliGRAM(s) Oral at bedtime  senna 2 Tablet(s) Oral at bedtime    PRN MEDICATIONS  acetaminophen     Tablet .. 650 milliGRAM(s) Oral every 6 hours PRN  melatonin 5 milliGRAM(s) Oral at bedtime PRN  polyethylene glycol 3350 17 Gram(s) Oral daily PRN                                            ------------------------------------------------------------  VITAL SIGNS: Last 24 Hours  T(C): 36.1 (04 Oct 2022 05:00), Max: 36.9 (03 Oct 2022 20:00)  T(F): 97 (04 Oct 2022 05:00), Max: 98.4 (03 Oct 2022 20:00)  HR: 69 (04 Oct 2022 05:00) (63 - 75)  BP: 131/59 (04 Oct 2022 05:00) (111/57 - 131/59)  BP(mean): --  RR: 18 (04 Oct 2022 05:00) (17 - 18)  SpO2: 93% (04 Oct 2022 05:00) (93% - 94%)                                               LABS:                                                                    RADIOLOGY:        PHYSICAL EXAM:      GENERAL: NAD, lying in bed comfortably  HEAD:  Atraumatic, Normocephalic  EYES: conjunctiva and sclera clear  ENT: Moist mucous membranes  NECK: Supple, No JVD  CHEST/LUNG: Clear to auscultation bilaterally; Unlabored respirations  HEART: Regular rate and rhythm;  ABDOMEN: Soft, nontender, nondistended  EXTREMITIES:  No clubbing, cyanosis, or edema  NERVOUS SYSTEM:  A&Ox2, no focal deficits   SKIN: No rashes or lesions                                         ASSESSMENT & PLAN    76 y/o M w/ pmhx of CAD (w/ stents) & legal blindness presents for social issue.  Patient lives w/ his girlfriend who cares for him at home. She was not home when he woke up this AM & was not able to reach her.   Patient decided to call 911 as he is not capable of taking care or medicating himself.     No medical complains on admission; Vital signs wnl in ED (except for x1 reading of elevated /84);    Labs unremarkable on admission; No need for daily labs    # Agitation Overnight   - s/p haldol once / zyprexa 2.5mg po at night ordered     # Social Admission - No one at home to take care of him  -  consulted  - PT Eval recommending Rehab / NH     # hx of CAD - w/ stent placement (~1 yr ago; as per pt); c/w home Aspirin & Plavix    # Legally Blind - Happened ~2yrs ago; As per pt, etiology was never diagnosed     # Mild Normocytic Anemia - Hb 13.6 on admission; No signs of bleeding; No further w/u indicated at the moment;     Diet: DASH  Activity: Ambulate w/ Assistance due to blindness   DVT ppx: Lovenox 40mg SQ Daily  GI ppx: n/a  FULL CODE    Pending: Placement / SW Evaluation     Dispo: Social admission d/c planning anticipated

## 2022-10-04 NOTE — CHART NOTE - NSCHARTNOTEFT_GEN_A_CORE
Was informed by RN that pt agitated, trying to kick staff, climb out of bed, etc.     Evaluated pt at bedside ~8:45pm.   Pt stated he wants to go home, has plan to call a cab to get home and states once he gets there his wife Avelina will come down and let him in. My understanding at sign out was that pt had wanted to get home to wife because she was feeling ill/has a mild viral illness and he wants to go home to hold her hand. Asked pt if this was the case, but pt reports that she had been feeling unwell earlier but that she began to feel better and had "gone out with the girls." Is not sure if she is home, is not sure of her phone number but told me that the nurses outside have her phone number. RN provided me with Avelina's phone number [phone] who is reportedly his girlfriend and not his wife. Called this phone number to investigate potential for safe discharge but was unable to reach anybody. In the meantime, asked pt about ADLs and whether he would be able to exist at home safely if we were to arrange transport for him. Stated no - Avelina has his keys and he is unable to care for himself alone at home.     Given inability to reach Avelina and ensure safe discharge, plan for now is to keep pt overnight for am discharge planning.
Agitated patient claiming the nurses need to pay him and didn't know when he was. He was becoming combative stripping his clothes off and trying to leave his room. 0.5mg haldol was administered and successfully calmed the patient.

## 2022-10-05 PROCEDURE — 99232 SBSQ HOSP IP/OBS MODERATE 35: CPT

## 2022-10-05 RX ORDER — HALOPERIDOL DECANOATE 100 MG/ML
1 INJECTION INTRAMUSCULAR ONCE
Refills: 0 | Status: COMPLETED | OUTPATIENT
Start: 2022-10-05 | End: 2022-10-05

## 2022-10-05 RX ADMIN — CARVEDILOL PHOSPHATE 6.25 MILLIGRAM(S): 80 CAPSULE, EXTENDED RELEASE ORAL at 17:03

## 2022-10-05 RX ADMIN — ENOXAPARIN SODIUM 40 MILLIGRAM(S): 100 INJECTION SUBCUTANEOUS at 18:17

## 2022-10-05 RX ADMIN — HALOPERIDOL DECANOATE 1 MILLIGRAM(S): 100 INJECTION INTRAMUSCULAR at 01:33

## 2022-10-05 RX ADMIN — SENNA PLUS 2 TABLET(S): 8.6 TABLET ORAL at 21:14

## 2022-10-05 RX ADMIN — Medication 81 MILLIGRAM(S): at 11:50

## 2022-10-05 RX ADMIN — ATORVASTATIN CALCIUM 80 MILLIGRAM(S): 80 TABLET, FILM COATED ORAL at 21:14

## 2022-10-05 RX ADMIN — OLANZAPINE 2.5 MILLIGRAM(S): 15 TABLET, FILM COATED ORAL at 21:14

## 2022-10-05 RX ADMIN — CARVEDILOL PHOSPHATE 6.25 MILLIGRAM(S): 80 CAPSULE, EXTENDED RELEASE ORAL at 05:09

## 2022-10-05 RX ADMIN — CLOPIDOGREL BISULFATE 75 MILLIGRAM(S): 75 TABLET, FILM COATED ORAL at 11:49

## 2022-10-05 NOTE — PROGRESS NOTE ADULT - TIME BILLING
direct pt care, chart review and interdisciplinary ronds
direct pt care, chart review and interdisciplinary ronds
direct pt care and interdisciplinary rounds

## 2022-10-05 NOTE — PROGRESS NOTE ADULT - SUBJECTIVE AND OBJECTIVE BOX
KIMMY MORRIS 77y Male  MRN#: [id number]   CODE STATUS:________full    Hospital Day: 4d    Pt is currently admitted with the primary diagnosis of Social admit    SUBJECTIVE  Overnight events     agitated received 1 mg haldol    Subjective complaints     Patient was examined and seen by the bedside. No complaints.                                            OBJECTIVE  PAST MEDICAL & SURGICAL HISTORY  Legally blind                                                ALLERGIES:  No Known Allergies                           HOME MEDICATIONS  Home Medications:  aspirin 81 mg oral delayed release tablet: 1 tab(s) orally once a day (01 Oct 2022 15:47)  atorvastatin 80 mg oral tablet: 1 tab(s) orally once a day (01 Oct 2022 15:47)  carvedilol 6.25 mg oral tablet: 1 tab(s) orally 2 times a day (01 Oct 2022 15:47)  Plavix 75 mg oral tablet: 1 tab(s) orally once a day (01 Oct 2022 15:47)  Senna 8.6 mg oral tablet: 1 tab(s) orally once a day (at bedtime) (01 Oct 2022 15:47)  Vitamin B-12 1000 mcg oral tablet: 1 tab(s) orally once a day (01 Oct 2022 15:47)                           MEDICATIONS:  STANDING MEDICATIONS  aspirin enteric coated 81 milliGRAM(s) Oral daily  atorvastatin 80 milliGRAM(s) Oral at bedtime  carvedilol Oral Tab/Cap - Peds 6.25 milliGRAM(s) Oral two times a day  clopidogrel Tablet 75 milliGRAM(s) Oral daily  enoxaparin Injectable 40 milliGRAM(s) SubCutaneous every 24 hours  OLANZapine 2.5 milliGRAM(s) Oral at bedtime  senna 2 Tablet(s) Oral at bedtime    PRN MEDICATIONS  acetaminophen     Tablet .. 650 milliGRAM(s) Oral every 6 hours PRN  melatonin 5 milliGRAM(s) Oral at bedtime PRN  polyethylene glycol 3350 17 Gram(s) Oral daily PRN                                            ------------------------------------------------------------  VITAL SIGNS: Last 24 Hours  T(C): 36.8 (05 Oct 2022 05:22), Max: 36.8 (05 Oct 2022 05:22)  T(F): 98.3 (05 Oct 2022 05:22), Max: 98.3 (05 Oct 2022 05:22)  HR: 81 (05 Oct 2022 05:22) (69 - 81)  BP: 156/67 (05 Oct 2022 05:22) (134/62 - 156/67)  BP(mean): --  RR: 18 (05 Oct 2022 05:22) (18 - 18)  SpO2: 97% (05 Oct 2022 05:22) (97% - 97%)                                               LABS:                                                                    RADIOLOGY:        PHYSICAL EXAM:      GENERAL: NAD, lying in bed comfortably  HEAD:  Atraumatic, Normocephalic  EYES: conjunctiva and sclera clear  ENT: Moist mucous membranes  NECK: Supple, No JVD  CHEST/LUNG: Clear to auscultation bilaterally; Unlabored respirations  HEART: Regular rate and rhythm;  ABDOMEN: Soft, nontender, nondistended  EXTREMITIES:  No clubbing, cyanosis, or edema  NERVOUS SYSTEM:  A&Ox2, no focal deficits   SKIN: No rashes or lesions                                         ASSESSMENT & PLAN    78 y/o M w/ pmhx of CAD (w/ stents) & legal blindness presents for social issue.  Patient lives w/ his girlfriend who cares for him at home. She was not home when he woke up this AM & was not able to reach her.   Patient decided to call 911 as he is not capable of taking care or medicating himself.     No medical complains on admission; Vital signs wnl in ED (except for x1 reading of elevated /84);    Labs unremarkable on admission; No need for daily labs    # Agitation Overnight   - s/p haldol once / zyprexa 2.5mg po at night ordered     # Social Admission - No one at home to take care of him  -  consulted  - PT Eval recommending Rehab / NH     # hx of CAD - w/ stent placement (~1 yr ago; as per pt); c/w home Aspirin & Plavix    # Legally Blind - Happened ~2yrs ago; As per pt, etiology was never diagnosed     # Mild Normocytic Anemia - Hb 13.6 on admission; No signs of bleeding; No further w/u indicated at the moment;     Diet: DASH  Activity: Ambulate w/ Assistance due to blindness   DVT ppx: Lovenox 40mg SQ Daily  GI ppx: n/a  FULL CODE    Pending placement

## 2022-10-05 NOTE — PROGRESS NOTE ADULT - SUBJECTIVE AND OBJECTIVE BOX
FOX MORRISDERICK  77y  Male    Patient is a 77y old  Male who presents with a chief complaint of Social Admission (01 Oct 2022 14:44)    OVERNIGHT EVENTS: doing better no complaints     PAST MEDICAL & SURGICAL HISTORY:  Legally blind  CAD  HLD   Constipation     Vital Signs Last 24 Hrs  T(C): 36.8 (05 Oct 2022 05:22), Max: 36.8 (05 Oct 2022 05:22)  T(F): 98.3 (05 Oct 2022 05:22), Max: 98.3 (05 Oct 2022 05:22)  HR: 81 (05 Oct 2022 05:22) (69 - 81)  BP: 156/67 (05 Oct 2022 05:22) (134/62 - 156/67)  RR: 18 (05 Oct 2022 05:22) (18 - 18)  SpO2: 97% (05 Oct 2022 05:22) (97% - 97%)    Parameters below as of 05 Oct 2022 05:22  Patient On (Oxygen Delivery Method): room air    Parameters below as of 02 Oct 2022 20:00  Patient On (Oxygen Delivery Method): room air    PHYSICAL EXAM:  GENERAL: NAD, well-developed  HEAD:  Atraumatic, Normocephalic  EYES: open blind   NECK: Supple, No JVD  CHEST/LUNG: Clear to auscultation bilaterally; No wheeze  HEART: Regular rate and rhythm; No murmurs, rubs, or gallops  ABDOMEN: Soft, Nontender, Nondistended; Bowel sounds present  EXTREMITIES:  2+ Peripheral Pulses, No clubbing, cyanosis, or edema  PSYCH: AAOx3  NEUROLOGY: non-focal  SKIN: No rashes or lesions    NO NEEDS FOR LABS UNLESS AN ACUTE COMPLAINT OR EVENT                        13.6   9.39  )-----------( 205      ( 01 Oct 2022 11:30 )             38.8       10-01    140  |  103  |  15  ----------------------------<  91  4.7   |  25  |  1.1    Ca    9.3      01 Oct 2022 11:30    TPro  6.4  /  Alb  4.4  /  TBili  0.8  /  DBili  x   /  AST  17  /  ALT  14  /  AlkPhos  105  10-01    MICROBIOLOGY: none    MEDICATIONS  (STANDING):  aspirin enteric coated 81 milliGRAM(s) Oral daily  atorvastatin 80 milliGRAM(s) Oral at bedtime  carvedilol Oral Tab/Cap - Peds 6.25 milliGRAM(s) Oral two times a day  clopidogrel Tablet 75 milliGRAM(s) Oral daily  enoxaparin Injectable 40 milliGRAM(s) SubCutaneous every 24 hours  OLANZapine 2.5 milliGRAM(s) Oral at bedtime  senna 2 Tablet(s) Oral at bedtime    MEDICATIONS  (PRN):  polyethylene glycol 3350 17 Gram(s) Oral daily PRN Constipation

## 2022-10-06 VITALS — HEART RATE: 91 BPM | SYSTOLIC BLOOD PRESSURE: 133 MMHG | DIASTOLIC BLOOD PRESSURE: 65 MMHG | RESPIRATION RATE: 18 BRPM

## 2022-10-06 PROCEDURE — 99232 SBSQ HOSP IP/OBS MODERATE 35: CPT

## 2022-10-06 RX ORDER — OLANZAPINE 15 MG/1
5 TABLET, FILM COATED ORAL AT BEDTIME
Refills: 0 | Status: DISCONTINUED | OUTPATIENT
Start: 2022-10-06 | End: 2022-10-06

## 2022-10-06 RX ORDER — OLANZAPINE 15 MG/1
2.5 TABLET, FILM COATED ORAL EVERY 6 HOURS
Refills: 0 | Status: DISCONTINUED | OUTPATIENT
Start: 2022-10-06 | End: 2022-10-06

## 2022-10-06 RX ADMIN — CARVEDILOL PHOSPHATE 6.25 MILLIGRAM(S): 80 CAPSULE, EXTENDED RELEASE ORAL at 18:44

## 2022-10-06 RX ADMIN — CLOPIDOGREL BISULFATE 75 MILLIGRAM(S): 75 TABLET, FILM COATED ORAL at 11:44

## 2022-10-06 RX ADMIN — Medication 81 MILLIGRAM(S): at 11:44

## 2022-10-06 RX ADMIN — CARVEDILOL PHOSPHATE 6.25 MILLIGRAM(S): 80 CAPSULE, EXTENDED RELEASE ORAL at 05:22

## 2022-10-06 NOTE — PROGRESS NOTE ADULT - SUBJECTIVE AND OBJECTIVE BOX
KIMMY MORRIS 77y Male  MRN#: [id number]   CODE STATUS:________full    Hospital Day: 5d    Pt is currently admitted with the primary diagnosis of social issues    SUBJECTIVE    No adverse overnight events     Subjective complaints     Patient was examined and seen by the bedside. Denies fever, chills, SOB, nausea, vomiting.                                           OBJECTIVE  PAST MEDICAL & SURGICAL HISTORY  Legally blind                                                ALLERGIES:  No Known Allergies                           HOME MEDICATIONS  Home Medications:  aspirin 81 mg oral delayed release tablet: 1 tab(s) orally once a day (01 Oct 2022 15:47)  atorvastatin 80 mg oral tablet: 1 tab(s) orally once a day (01 Oct 2022 15:47)  carvedilol 6.25 mg oral tablet: 1 tab(s) orally 2 times a day (01 Oct 2022 15:47)  Plavix 75 mg oral tablet: 1 tab(s) orally once a day (01 Oct 2022 15:47)  Senna 8.6 mg oral tablet: 1 tab(s) orally once a day (at bedtime) (01 Oct 2022 15:47)  Vitamin B-12 1000 mcg oral tablet: 1 tab(s) orally once a day (01 Oct 2022 15:47)                           MEDICATIONS:  STANDING MEDICATIONS  aspirin enteric coated 81 milliGRAM(s) Oral daily  atorvastatin 80 milliGRAM(s) Oral at bedtime  carvedilol Oral Tab/Cap - Peds 6.25 milliGRAM(s) Oral two times a day  clopidogrel Tablet 75 milliGRAM(s) Oral daily  enoxaparin Injectable 40 milliGRAM(s) SubCutaneous every 24 hours  OLANZapine 2.5 milliGRAM(s) Oral at bedtime  senna 2 Tablet(s) Oral at bedtime    PRN MEDICATIONS  acetaminophen     Tablet .. 650 milliGRAM(s) Oral every 6 hours PRN  melatonin 5 milliGRAM(s) Oral at bedtime PRN  polyethylene glycol 3350 17 Gram(s) Oral daily PRN                                            ------------------------------------------------------------  VITAL SIGNS: Last 24 Hours  T(C): 35.9 (06 Oct 2022 05:00), Max: 36.7 (05 Oct 2022 20:57)  T(F): 96.6 (06 Oct 2022 05:00), Max: 98 (05 Oct 2022 20:57)  HR: 67 (06 Oct 2022 05:00) (67 - 69)  BP: 163/77 (06 Oct 2022 05:00) (138/64 - 163/77)  BP(mean): --  RR: 18 (06 Oct 2022 05:00) (18 - 20)  SpO2: 96% (06 Oct 2022 05:00) (95% - 96%)      10-05-22 @ 07:01  -  10-06-22 @ 07:00  --------------------------------------------------------  IN: 0 mL / OUT: 200 mL / NET: -200 mL                                               LABS:                                                                    RADIOLOGY:        PHYSICAL EXAM:    GENERAL: NAD, lying in bed comfortably  HEAD:  Atraumatic, Normocephalic  EYES: conjunctiva and sclera clear  ENT: Moist mucous membranes  NECK: Supple, No JVD  CHEST/LUNG: Clear to auscultation bilaterally; Unlabored respirations  HEART: Regular rate and rhythm;  ABDOMEN: Soft, nontender, nondistended  EXTREMITIES:  No clubbing, cyanosis, or edema  NERVOUS SYSTEM:  A&Ox2, no focal deficits   SKIN: No rashes or lesions                                         ASSESSMENT & PLAN    76 y/o M w/ pmhx of CAD (w/ stents) & legal blindness presents for social issue.  Patient lives w/ his girlfriend who cares for him at home. She was not home when he woke up this AM & was not able to reach her.   Patient decided to call 911 as he is not capable of taking care or medicating himself.     No medical complains on admission; Vital signs wnl in ED (except for x1 reading of elevated /84);    Labs unremarkable on admission; No need for daily labs    # Agitation Overnight   - s/p haldol once / zyprexa 2.5mg po at night ordered     # Social Admission - No one at home to take care of him  -  consulted  - PT Eval recommending Rehab / NH     # hx of CAD - w/ stent placement (~1 yr ago; as per pt); c/w home Aspirin & Plavix    # Legally Blind - Happened ~2yrs ago; As per pt, etiology was never diagnosed     # Mild Normocytic Anemia - Hb 13.6 on admission; No signs of bleeding; No further w/u indicated at the moment;     Diet: DASH  Activity: Ambulate w/ Assistance due to blindness   DVT ppx: Lovenox 40mg SQ Daily  GI ppx: n/a  FULL CODE    Pending placement                 KIMMY MORRIS 77y Male  MRN#: [id number]   CODE STATUS:________full    Hospital Day: 5d    Pt is currently admitted with the primary diagnosis of social issues    SUBJECTIVE    overnight events     Night terrors thinks people are beating him up a night but understands its not real.    Subjective complaints     Patient was examined and seen by the bedside. Denies fever, chills, SOB, nausea, vomiting.                                           OBJECTIVE  PAST MEDICAL & SURGICAL HISTORY  Legally blind                                                ALLERGIES:  No Known Allergies                           HOME MEDICATIONS  Home Medications:  aspirin 81 mg oral delayed release tablet: 1 tab(s) orally once a day (01 Oct 2022 15:47)  atorvastatin 80 mg oral tablet: 1 tab(s) orally once a day (01 Oct 2022 15:47)  carvedilol 6.25 mg oral tablet: 1 tab(s) orally 2 times a day (01 Oct 2022 15:47)  Plavix 75 mg oral tablet: 1 tab(s) orally once a day (01 Oct 2022 15:47)  Senna 8.6 mg oral tablet: 1 tab(s) orally once a day (at bedtime) (01 Oct 2022 15:47)  Vitamin B-12 1000 mcg oral tablet: 1 tab(s) orally once a day (01 Oct 2022 15:47)                           MEDICATIONS:  STANDING MEDICATIONS  aspirin enteric coated 81 milliGRAM(s) Oral daily  atorvastatin 80 milliGRAM(s) Oral at bedtime  carvedilol Oral Tab/Cap - Peds 6.25 milliGRAM(s) Oral two times a day  clopidogrel Tablet 75 milliGRAM(s) Oral daily  enoxaparin Injectable 40 milliGRAM(s) SubCutaneous every 24 hours  OLANZapine 2.5 milliGRAM(s) Oral at bedtime  senna 2 Tablet(s) Oral at bedtime    PRN MEDICATIONS  acetaminophen     Tablet .. 650 milliGRAM(s) Oral every 6 hours PRN  melatonin 5 milliGRAM(s) Oral at bedtime PRN  polyethylene glycol 3350 17 Gram(s) Oral daily PRN                                            ------------------------------------------------------------  VITAL SIGNS: Last 24 Hours  T(C): 35.9 (06 Oct 2022 05:00), Max: 36.7 (05 Oct 2022 20:57)  T(F): 96.6 (06 Oct 2022 05:00), Max: 98 (05 Oct 2022 20:57)  HR: 67 (06 Oct 2022 05:00) (67 - 69)  BP: 163/77 (06 Oct 2022 05:00) (138/64 - 163/77)  BP(mean): --  RR: 18 (06 Oct 2022 05:00) (18 - 20)  SpO2: 96% (06 Oct 2022 05:00) (95% - 96%)      10-05-22 @ 07:01  -  10-06-22 @ 07:00  --------------------------------------------------------  IN: 0 mL / OUT: 200 mL / NET: -200 mL                                               LABS:                                                                    RADIOLOGY:        PHYSICAL EXAM:    GENERAL: NAD, lying in bed comfortably  HEAD:  Atraumatic, Normocephalic  EYES: conjunctiva and sclera clear  ENT: Moist mucous membranes  NECK: Supple, No JVD  CHEST/LUNG: Clear to auscultation bilaterally; Unlabored respirations  HEART: Regular rate and rhythm;  ABDOMEN: Soft, nontender, nondistended  EXTREMITIES:  No clubbing, cyanosis, or edema  NERVOUS SYSTEM:  A&Ox2, no focal deficits   SKIN: No rashes or lesions                                         ASSESSMENT & PLAN    78 y/o M w/ pmhx of CAD (w/ stents) & legal blindness presents for social issue.  Patient lives w/ his girlfriend who cares for him at home. She was not home when he woke up this AM & was not able to reach her.   Patient decided to call 911 as he is not capable of taking care or medicating himself.     No medical complains on admission; Vital signs wnl in ED (except for x1 reading of elevated /84);    Labs unremarkable on admission; No need for daily labs    # Agitation Overnight   - haldol 0.5  prn  - zyprexa 2.5mg increased to 5mg po at night     # Social Admission - No one at home to take care of him  -  consulted  - PT Eval recommending Rehab / NH     # hx of CAD - w/ stent placement (~1 yr ago; as per pt); c/w home Aspirin & Plavix    # Legally Blind - Happened ~2yrs ago; As per pt, etiology was never diagnosed     # Mild Normocytic Anemia - Hb 13.6 on admission; No signs of bleeding; No further w/u indicated at the moment;     Diet: DASH  Activity: Ambulate w/ Assistance due to blindness   DVT ppx: Lovenox 40mg SQ Daily  GI ppx: n/a  FULL CODE    Pending placement

## 2022-10-06 NOTE — PROGRESS NOTE ADULT - PROVIDER SPECIALTY LIST ADULT
Internal Medicine
Hospitalist

## 2022-10-06 NOTE — PROGRESS NOTE ADULT - SUBJECTIVE AND OBJECTIVE BOX
Patient is a 77y old  Male who presents with a chief complaint of Social Admission (06 Oct 2022 08:09)      Patient seen and examined at bedside.    ALLERGIES:  No Known Allergies    MEDICATIONS:  acetaminophen     Tablet .. 650 milliGRAM(s) Oral every 6 hours PRN  aspirin enteric coated 81 milliGRAM(s) Oral daily  atorvastatin 80 milliGRAM(s) Oral at bedtime  carvedilol Oral Tab/Cap - Peds 6.25 milliGRAM(s) Oral two times a day  clopidogrel Tablet 75 milliGRAM(s) Oral daily  enoxaparin Injectable 40 milliGRAM(s) SubCutaneous every 24 hours  melatonin 5 milliGRAM(s) Oral at bedtime PRN  OLANZapine 5 milliGRAM(s) Oral at bedtime  OLANZapine 2.5 milliGRAM(s) Oral every 6 hours PRN  polyethylene glycol 3350 17 Gram(s) Oral daily PRN  senna 2 Tablet(s) Oral at bedtime    Vital Signs Last 24 Hrs  T(F): 98.2 (06 Oct 2022 13:35), Max: 98.2 (06 Oct 2022 13:35)  HR: 74 (06 Oct 2022 13:35) (67 - 74)  BP: 124/58 (06 Oct 2022 13:35) (124/58 - 163/77)  RR: 18 (06 Oct 2022 13:35) (18 - 20)  SpO2: 92% (06 Oct 2022 13:35) (92% - 96%)  I&O's Summary    05 Oct 2022 07:01  -  06 Oct 2022 07:00  --------------------------------------------------------  IN: 0 mL / OUT: 200 mL / NET: -200 mL        PHYSICAL EXAM:  General: NAD, A/O x 3  ENT: MMM  Neck: Supple, No JVD  Lungs: Clear to auscultation bilaterally  Cardio: RRR, S1/S2, 2/6 systolic murmur   Abdomen: Soft, Nontender, Nondistended; Bowel sounds present  Extremities: No cyanosis, No edema    LABS:                                          COVID-19 PCR: NotDetec (10-01-22 @ 11:20)      RADIOLOGY & ADDITIONAL TESTS:    Care Discussed with Consultants/Other Providers:

## 2022-10-06 NOTE — PROGRESS NOTE ADULT - ASSESSMENT
76 y/o M w/ pmhx of CAD (w/ stents) & legal blindness presents for social issue.  Patient lives w/ his girlfriend who cares for him at home. She was not home when he woke up this AM & was not able to reach her.   Patient decided to call 911 as he is not capable of taking care or medicating himself.     No medical complains on admission; Vital signs wnl in ED (except for x1 reading of elevated /84);    Labs unremarkable on admission; No need for daily labs    # Agitation Overnight   - zyprexa 2.5mg prn as needed   - zyprexa 2.5mg increased to 5mg po at night     # Social Admission - No one at home to take care of him  -  consulted  - PT [facility] recommending Rehab / NH     # hx of CAD - w/ stent placement (~1 yr ago; as per pt); c/w home Aspirin & Plavix    # Legally Blind - Happened ~2yrs ago; As per pt, etiology was never diagnosed     # Mild Normocytic Anemia - Hb 13.6 on admission; No signs of bleeding; No further w/u indicated at the moment;     Diet: DASH  Activity: Ambulate w/ Assistance due to blindness   DVT ppx: Lovenox 40mg SQ Daily  GI ppx: n/a  FULL CODE    Pending placement, medically stable to discharge   
76 y/o M w/ pmhx of CAD (w/ stents) & legal blindness presents for social issue.  Patient lives w/ his girlfriend who cares for him at home. She was not home when he woke up this AM & was not able to reach her.   Patient decided to call 911 as he is not capable of taking care or medicating himself.     No medical complains on admission; Vital signs wnl in ED (except for x1 reading of elevated /84);    Labs unremarkable on admission; No need for daily labs    # Agitation Overnight   - s/p haldol once / zyprexa 2.5mg po at night ordered     # Social Admission - No one at home to take care of him  -  consulted  - PT [facility] recommending Rehab / NH     # hx of CAD - w/ stent placement (~1 yr ago; as per pt); c/w home Aspirin & Plavix    # Legally Blind - Happened ~2yrs ago; As per pt, etiology was never diagnosed     # Mild Normocytic Anemia - Hb 13.6 on admission; No signs of bleeding; No further w/u indicated at the moment;     Diet: DASH  Activity: Ambulate w/ Assistance due to blindness   DVT ppx: Lovenox 40mg SQ Daily  GI ppx: n/a  FULL CODE    Pending: Placement / SW Evaluation     Dispo: Social admission d/c planning anticipated 
78 y/o M w/ pmhx of CAD (w/ stents) & legal blindness presents for social issue.  Patient lives w/ his girlfriend who cares for him at home. She was not home when he woke up this AM & was not able to reach her.   Patient decided to call 911 as he is not capable of taking care or medicating himself.     No medical complains on admission; Vital signs wnl in ED (except for x1 reading of elevated /84);    Labs unremarkable on admission; No need for daily labs    # Agitation Overnight   - s/p haldol once / zyprexa 2.5mg po at night ordered     # Social Admission - No one at home to take care of him  -  consulted  - PT [facility] recommending Rehab / NH     # hx of CAD - w/ stent placement (~1 yr ago; as per pt); c/w home Aspirin & Plavix    # Legally Blind - Happened ~2yrs ago; As per pt, etiology was never diagnosed     # Mild Normocytic Anemia - Hb 13.6 on admission; No signs of bleeding; No further w/u indicated at the moment;     Diet: DASH  Activity: Ambulate w/ Assistance due to blindness   DVT ppx: Lovenox 40mg SQ Daily  GI ppx: n/a  FULL CODE    Pending: Placement / SW Evaluation     Dispo: Social admission d/c planning anticipated 
78 y/o M w/ pmhx of CAD (w/ stents) & legal blindness presents for social issue.  Patient lives w/ his girlfriend who cares for him at home. She was not home when he woke up this AM & was not able to reach her.   Patient decided to call 911 as he is not capable of taking care or medicating himself.     No medical complains on admission; Vital signs wnl in ED (except for x1 reading of elevated /84);    Labs unremarkable on admission; No need for daily labs    # Agitation Overnight   - s/p haldol once / zyprexa 2.5mg po at night ordered     # Social Admission - No one at home to take care of him  -  consulted  - PT [facility] recommending Rehab / NH     # hx of CAD - w/ stent placement (~1 yr ago; as per pt); c/w home Aspirin & Plavix    # Legally Blind - Happened ~2yrs ago; As per pt, etiology was never diagnosed     # Mild Normocytic Anemia - Hb 13.6 on admission; No signs of bleeding; No further w/u indicated at the moment;     Diet: DASH  Activity: Ambulate w/ Assistance due to blindness   DVT ppx: Lovenox 40mg SQ Daily  GI ppx: n/a  FULL CODE    Dispo: Social admission d/c planning anticipated / no care taker and refuses to go to rehab / LEGALLY BLIND needs assistance with ADLs and feeding. Girl Friend of 2 yrs - Avelina is unreliable and not committed to take care of him. Pt is AOx3. Has no key to his house. CM working on case.

## 2022-10-06 NOTE — PROGRESS NOTE ADULT - REASON FOR ADMISSION
Social Admission

## 2022-10-11 DIAGNOSIS — Z74.2 NEED FOR ASSISTANCE AT HOME AND NO OTHER HOUSEHOLD MEMBER ABLE TO RENDER CARE: ICD-10-CM

## 2022-10-11 DIAGNOSIS — Z95.5 PRESENCE OF CORONARY ANGIOPLASTY IMPLANT AND GRAFT: ICD-10-CM

## 2022-10-11 DIAGNOSIS — I25.10 ATHEROSCLEROTIC HEART DISEASE OF NATIVE CORONARY ARTERY WITHOUT ANGINA PECTORIS: ICD-10-CM

## 2022-10-11 DIAGNOSIS — D64.9 ANEMIA, UNSPECIFIED: ICD-10-CM

## 2022-10-11 DIAGNOSIS — R45.1 RESTLESSNESS AND AGITATION: ICD-10-CM

## 2022-10-11 DIAGNOSIS — Z20.822 CONTACT WITH AND (SUSPECTED) EXPOSURE TO COVID-19: ICD-10-CM

## 2022-10-11 DIAGNOSIS — Z79.82 LONG TERM (CURRENT) USE OF ASPIRIN: ICD-10-CM

## 2022-10-11 DIAGNOSIS — H54.8 LEGAL BLINDNESS, AS DEFINED IN USA: ICD-10-CM

## 2022-10-11 DIAGNOSIS — Z79.02 LONG TERM (CURRENT) USE OF ANTITHROMBOTICS/ANTIPLATELETS: ICD-10-CM

## 2022-10-11 DIAGNOSIS — Z73.6 LIMITATION OF ACTIVITIES DUE TO DISABILITY: ICD-10-CM

## 2024-02-25 RX ORDER — CARVEDILOL PHOSPHATE 80 MG/1
1 CAPSULE, EXTENDED RELEASE ORAL
Qty: 0 | Refills: 0 | DISCHARGE

## 2024-02-25 RX ORDER — ASPIRIN/CALCIUM CARB/MAGNESIUM 324 MG
1 TABLET ORAL
Qty: 0 | Refills: 0 | DISCHARGE

## 2024-02-25 RX ORDER — PREGABALIN 225 MG/1
1 CAPSULE ORAL
Qty: 0 | Refills: 0 | DISCHARGE

## 2024-02-25 RX ORDER — SENNA PLUS 8.6 MG/1
1 TABLET ORAL
Qty: 0 | Refills: 0 | DISCHARGE

## 2024-02-25 RX ORDER — ATORVASTATIN CALCIUM 80 MG/1
1 TABLET, FILM COATED ORAL
Qty: 0 | Refills: 0 | DISCHARGE

## 2024-02-25 RX ORDER — CLOPIDOGREL BISULFATE 75 MG/1
1 TABLET, FILM COATED ORAL
Qty: 0 | Refills: 0 | DISCHARGE